# Patient Record
Sex: FEMALE | Race: WHITE | Employment: UNEMPLOYED | URBAN - METROPOLITAN AREA
[De-identification: names, ages, dates, MRNs, and addresses within clinical notes are randomized per-mention and may not be internally consistent; named-entity substitution may affect disease eponyms.]

---
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
## 2024-12-06 ENCOUNTER — OFFICE VISIT (OUTPATIENT)
Dept: URGENT CARE | Age: 19
End: 2024-12-06
Payer: COMMERCIAL

## 2024-12-06 VITALS
TEMPERATURE: 98.5 F | OXYGEN SATURATION: 99 % | HEART RATE: 94 BPM | SYSTOLIC BLOOD PRESSURE: 115 MMHG | RESPIRATION RATE: 16 BRPM | DIASTOLIC BLOOD PRESSURE: 84 MMHG

## 2024-12-06 DIAGNOSIS — J02.9 SORE THROAT: ICD-10-CM

## 2024-12-06 LAB
POC RAPID MONO: NEGATIVE
POC RAPID STREP: NEGATIVE

## 2024-12-06 ASSESSMENT — ENCOUNTER SYMPTOMS
RESPIRATORY NEGATIVE: 1
SORE THROAT: 1
CARDIOVASCULAR NEGATIVE: 1
CONSTITUTIONAL NEGATIVE: 1

## 2024-12-06 NOTE — PROGRESS NOTES
Subjective   Patient ID: Sintia Chilel is a 19 y.o. female. They present today with a chief complaint of Sore Throat.    History of Present Illness  19-year-old female presents clinic today with complaint of a sore throat.  Patient states that this has been ongoing for last couple days.  She states its mainly on the left than on the right.  She denies any cough or congestion.  Denies any fevers.  Denies body aches or chills.  She states that it woke her up in the night.  States it is one of the more severe sore throats she has had in her life.      Sore Throat         Past Medical History  Allergies as of 12/06/2024    (No Known Allergies)       (Not in a hospital admission)       Past Medical History:   Diagnosis Date    Other specified health status 10/23/2017    Known health problems: none       History reviewed. No pertinent surgical history.         Review of Systems  Review of Systems   Constitutional: Negative.    HENT:  Positive for sore throat.    Respiratory: Negative.     Cardiovascular: Negative.                                   Objective    Vitals:    12/06/24 1008   BP: 115/84   Pulse: 94   Resp: 16   Temp: 36.9 °C (98.5 °F)   SpO2: 99%     No LMP recorded.    Physical Exam  Constitutional:       General: She is not in acute distress.     Appearance: Normal appearance. She is not ill-appearing.   HENT:      Mouth/Throat:      Mouth: Mucous membranes are moist.      Pharynx: Posterior oropharyngeal erythema present. No oropharyngeal exudate.   Lymphadenopathy:      Cervical: No cervical adenopathy.   Neurological:      Mental Status: She is alert.         Procedures    Point of Care Test & Imaging Results from this visit  No results found for this visit on 12/06/24.   No results found.    Diagnostic study results (if any) were reviewed by [facility].    Assessment/Plan   Allergies, medications, history, and pertinent labs/EKGs/Imaging reviewed by Moses Nava PA-C.     Medical Decision  Making  Patient pleasant and cooperative on examination.    There is very minor pharyngeal erythema.  There are no exudate.  No hypertrophy noted.    Rapid strep and mono negative.    I discussed with patient that this is most likely a viral pharyngitis in nature.  I advised her on proper over-the-counter medications to supplement for the sore throat.  Monitor for worsening or persistent signs and if these occur to let us or primary care know.    Patient alert and oriented, no acute distress about discharge.    Orders and Diagnoses  Diagnoses and all orders for this visit:  Sore throat  -     POCT rapid strep A manually resulted      Medical Admin Record      Patient disposition: Home    Electronically signed by [facility]  10:16 AM